# Patient Record
Sex: MALE | Race: WHITE | NOT HISPANIC OR LATINO | URBAN - METROPOLITAN AREA
[De-identification: names, ages, dates, MRNs, and addresses within clinical notes are randomized per-mention and may not be internally consistent; named-entity substitution may affect disease eponyms.]

---

## 2017-05-02 ENCOUNTER — APPOINTMENT (RX ONLY)
Dept: URBAN - METROPOLITAN AREA CLINIC 24 | Facility: CLINIC | Age: 67
Setting detail: DERMATOLOGY
End: 2017-05-02

## 2017-05-02 DIAGNOSIS — L81.4 OTHER MELANIN HYPERPIGMENTATION: ICD-10-CM

## 2017-05-02 DIAGNOSIS — L57.0 ACTINIC KERATOSIS: ICD-10-CM

## 2017-05-02 DIAGNOSIS — D18.0 HEMANGIOMA: ICD-10-CM

## 2017-05-02 DIAGNOSIS — D22 MELANOCYTIC NEVI: ICD-10-CM

## 2017-05-02 DIAGNOSIS — L82.1 OTHER SEBORRHEIC KERATOSIS: ICD-10-CM

## 2017-05-02 DIAGNOSIS — Z85.828 PERSONAL HISTORY OF OTHER MALIGNANT NEOPLASM OF SKIN: ICD-10-CM

## 2017-05-02 DIAGNOSIS — Z87.2 PERSONAL HISTORY OF DISEASES OF THE SKIN AND SUBCUTANEOUS TISSUE: ICD-10-CM

## 2017-05-02 PROBLEM — D22.5 MELANOCYTIC NEVI OF TRUNK: Status: ACTIVE | Noted: 2017-05-02

## 2017-05-02 PROBLEM — L29.8 OTHER PRURITUS: Status: ACTIVE | Noted: 2017-05-02

## 2017-05-02 PROBLEM — D18.01 HEMANGIOMA OF SKIN AND SUBCUTANEOUS TISSUE: Status: ACTIVE | Noted: 2017-05-02

## 2017-05-02 PROCEDURE — 99213 OFFICE O/P EST LOW 20 MIN: CPT | Mod: 25

## 2017-05-02 PROCEDURE — 17003 DESTRUCT PREMALG LES 2-14: CPT

## 2017-05-02 PROCEDURE — 17000 DESTRUCT PREMALG LESION: CPT

## 2017-05-02 PROCEDURE — ? COUNSELING

## 2017-05-02 PROCEDURE — ? LIQUID NITROGEN

## 2017-05-02 ASSESSMENT — LOCATION DETAILED DESCRIPTION DERM
LOCATION DETAILED: RIGHT SUPERIOR LATERAL MIDBACK
LOCATION DETAILED: RIGHT MEDIAL TRAPEZIAL NECK
LOCATION DETAILED: RIGHT POSTERIOR SHOULDER
LOCATION DETAILED: LEFT POSTERIOR SHOULDER
LOCATION DETAILED: LEFT SUPERIOR MEDIAL FOREHEAD
LOCATION DETAILED: RIGHT MEDIAL SUPERIOR CHEST
LOCATION DETAILED: LEFT INFERIOR CENTRAL MALAR CHEEK
LOCATION DETAILED: LEFT SUPERIOR CENTRAL MALAR CHEEK
LOCATION DETAILED: PERIUMBILICAL SKIN
LOCATION DETAILED: RIGHT INFERIOR CENTRAL MALAR CHEEK
LOCATION DETAILED: RIGHT LATERAL FOREHEAD

## 2017-05-02 ASSESSMENT — LOCATION SIMPLE DESCRIPTION DERM
LOCATION SIMPLE: RIGHT FOREHEAD
LOCATION SIMPLE: LEFT FOREHEAD
LOCATION SIMPLE: ABDOMEN
LOCATION SIMPLE: RIGHT CHEEK
LOCATION SIMPLE: LEFT CHEEK
LOCATION SIMPLE: LEFT SHOULDER
LOCATION SIMPLE: CHEST
LOCATION SIMPLE: RIGHT SHOULDER
LOCATION SIMPLE: RIGHT LOWER BACK
LOCATION SIMPLE: POSTERIOR NECK

## 2017-05-02 ASSESSMENT — LOCATION ZONE DERM
LOCATION ZONE: NECK
LOCATION ZONE: ARM
LOCATION ZONE: FACE
LOCATION ZONE: TRUNK

## 2017-05-02 NOTE — PROCEDURE: LIQUID NITROGEN
Number Of Freeze-Thaw Cycles: 1 freeze-thaw cycle
Post-Care Instructions: I reviewed with the patient in detail post-care instructions. Patient is to wear sunprotection, and avoid picking at any of the treated lesions. Pt may apply Vaseline to crusted or scabbing areas.
Duration Of Freeze Thaw-Cycle (Seconds): 4
Detail Level: Simple
Consent: The patient's consent was obtained including but not limited to risks of crusting, scabbing, blistering, scarring, darker or lighter pigmentary change, recurrence, incomplete removal and infection.
Render Post-Care Instructions In Note?: no

## 2017-12-11 ENCOUNTER — RX ONLY (OUTPATIENT)
Age: 67
Setting detail: RX ONLY
End: 2017-12-11

## 2017-12-11 RX ORDER — BETAMETHASONE DIPROPIONATE 0.5 MG/G
LOTION TOPICAL
Qty: 1 | Refills: 4 | Status: ERX

## 2018-02-26 PROBLEM — R53.83 FATIGUE: Status: ACTIVE | Noted: 2018-02-26

## 2018-08-09 ENCOUNTER — HOSPITAL ENCOUNTER (OUTPATIENT)
Dept: MRI IMAGING | Age: 68
Discharge: HOME | End: 2018-08-09
Attending: INTERNAL MEDICINE
Payer: MEDICARE

## 2018-08-09 DIAGNOSIS — R41.3 MEMORY LOSS: ICD-10-CM

## 2018-08-09 PROCEDURE — 70551 MRI BRAIN STEM W/O DYE: CPT

## 2018-09-24 ENCOUNTER — HOSPITAL ENCOUNTER (OUTPATIENT)
Dept: LAB | Age: 68
Discharge: HOME | End: 2018-09-24

## 2018-09-24 PROCEDURE — 88305 TISSUE EXAM BY PATHOLOGIST: CPT

## 2018-09-27 NOTE — PROGRESS NOTES
Please call patient to let him know that results of biopsy came back negative (NO CANCER). He can cancel his TRUS talk appointment with me and instead return in 6 months with PSA. Can you get him scheduled for this?   Thank you

## 2019-12-30 ENCOUNTER — HOSPITAL ENCOUNTER (OUTPATIENT)
Dept: SURGERY | Age: 69
Discharge: HOME | End: 2019-12-30

## 2019-12-30 VITALS — HEIGHT: 69 IN | BODY MASS INDEX: 29.62 KG/M2 | WEIGHT: 200 LBS

## 2020-01-06 ENCOUNTER — ANESTHESIA EVENT (OUTPATIENT)
Dept: ENDOSCOPY | Age: 70
End: 2020-01-06
Payer: MEDICARE

## 2020-01-06 ENCOUNTER — ANESTHESIA (OUTPATIENT)
Dept: ENDOSCOPY | Age: 70
End: 2020-01-06
Payer: MEDICARE

## 2020-01-06 ENCOUNTER — HOSPITAL ENCOUNTER (OUTPATIENT)
Age: 70
Setting detail: OUTPATIENT SURGERY
Discharge: HOME | End: 2020-01-06
Attending: SURGERY | Admitting: SURGERY
Payer: MEDICARE

## 2020-01-06 VITALS
OXYGEN SATURATION: 96 % | RESPIRATION RATE: 15 BRPM | WEIGHT: 206 LBS | DIASTOLIC BLOOD PRESSURE: 62 MMHG | HEART RATE: 69 BPM | BODY MASS INDEX: 30.42 KG/M2 | SYSTOLIC BLOOD PRESSURE: 111 MMHG | TEMPERATURE: 97.8 F

## 2020-01-06 DIAGNOSIS — Z86.010 HISTORY OF COLON POLYPS: ICD-10-CM

## 2020-01-06 PROCEDURE — 74011250636 HC RX REV CODE- 250/636: Performed by: ANESTHESIOLOGY

## 2020-01-06 PROCEDURE — 74011250636 HC RX REV CODE- 250/636: Performed by: NURSE ANESTHETIST, CERTIFIED REGISTERED

## 2020-01-06 PROCEDURE — 74011000250 HC RX REV CODE- 250: Performed by: NURSE ANESTHETIST, CERTIFIED REGISTERED

## 2020-01-06 PROCEDURE — 76040000026: Performed by: SURGERY

## 2020-01-06 PROCEDURE — 76060000032 HC ANESTHESIA 0.5 TO 1 HR: Performed by: SURGERY

## 2020-01-06 RX ORDER — PROPOFOL 10 MG/ML
INJECTION, EMULSION INTRAVENOUS AS NEEDED
Status: DISCONTINUED | OUTPATIENT
Start: 2020-01-06 | End: 2020-01-06 | Stop reason: HOSPADM

## 2020-01-06 RX ORDER — EPHEDRINE SULFATE/0.9% NACL/PF 50 MG/5 ML
SYRINGE (ML) INTRAVENOUS AS NEEDED
Status: DISCONTINUED | OUTPATIENT
Start: 2020-01-06 | End: 2020-01-06 | Stop reason: HOSPADM

## 2020-01-06 RX ORDER — OXYCODONE AND ACETAMINOPHEN 10; 325 MG/1; MG/1
1 TABLET ORAL AS NEEDED
Status: CANCELLED | OUTPATIENT
Start: 2020-01-06

## 2020-01-06 RX ORDER — OXYCODONE HYDROCHLORIDE 5 MG/1
5 TABLET ORAL
Status: CANCELLED | OUTPATIENT
Start: 2020-01-06 | End: 2020-01-07

## 2020-01-06 RX ORDER — SODIUM CHLORIDE 0.9 % (FLUSH) 0.9 %
5-40 SYRINGE (ML) INJECTION EVERY 8 HOURS
Status: CANCELLED | OUTPATIENT
Start: 2020-01-06

## 2020-01-06 RX ORDER — SODIUM CHLORIDE, SODIUM LACTATE, POTASSIUM CHLORIDE, CALCIUM CHLORIDE 600; 310; 30; 20 MG/100ML; MG/100ML; MG/100ML; MG/100ML
75 INJECTION, SOLUTION INTRAVENOUS CONTINUOUS
Status: DISCONTINUED | OUTPATIENT
Start: 2020-01-06 | End: 2020-01-06 | Stop reason: HOSPADM

## 2020-01-06 RX ORDER — PROPOFOL 10 MG/ML
INJECTION, EMULSION INTRAVENOUS
Status: DISCONTINUED | OUTPATIENT
Start: 2020-01-06 | End: 2020-01-06 | Stop reason: HOSPADM

## 2020-01-06 RX ORDER — SODIUM CHLORIDE 0.9 % (FLUSH) 0.9 %
5-40 SYRINGE (ML) INJECTION AS NEEDED
Status: CANCELLED | OUTPATIENT
Start: 2020-01-06

## 2020-01-06 RX ORDER — HYDROMORPHONE HYDROCHLORIDE 2 MG/ML
0.5 INJECTION, SOLUTION INTRAMUSCULAR; INTRAVENOUS; SUBCUTANEOUS
Status: CANCELLED | OUTPATIENT
Start: 2020-01-06

## 2020-01-06 RX ADMIN — SODIUM CHLORIDE, SODIUM LACTATE, POTASSIUM CHLORIDE, AND CALCIUM CHLORIDE 75 ML/HR: 600; 310; 30; 20 INJECTION, SOLUTION INTRAVENOUS at 07:00

## 2020-01-06 RX ADMIN — Medication 10 MG: at 08:15

## 2020-01-06 RX ADMIN — Medication 10 MG: at 07:50

## 2020-01-06 RX ADMIN — PROPOFOL 180 MCG/KG/MIN: 10 INJECTION, EMULSION INTRAVENOUS at 07:40

## 2020-01-06 RX ADMIN — PROPOFOL 100 MG: 10 INJECTION, EMULSION INTRAVENOUS at 07:40

## 2020-01-06 NOTE — OP NOTES
Colonoscopy Procedure Note    Date of procedure: 2020      Name: Savana Rodriguez      MRN: [id number]       : 1950       Age: 78 y.o. Sex: male    Preoperative Diagnosis: 1. History of polyps          Postoperative Diagnosis: 1. same      2. Diverticulosis       3. Internal hemorrhoids    Procedure in Detail:  Informed consent was obtained for the procedure. The patient was placed in the left lateral decubitus position and sedation was induced by anesthesia. The [id number] was inserted into the rectum and advanced under direct vision to the cecum, which was identified by the ileocecal valve and appendiceal orifice. The quality of the colonic preparation was fair. A careful inspection was made as the colonoscope was withdrawn, including a retroflexed view of the rectum; findings and interventions are described below. Appropriate photodocumentation was obtained. Findings:   Rectum:     - Protruding lesions:     -Internal Hemorrhoids  Sigmoid:     - Excavated lesions:     - Diverticulosis  Descending Colon:     - Excavated lesions:     - Diverticulosis  Transverse Colon:   Normal  Ascending Colon:   Normal  Cecum:   Normal    Specimens: No specimens were collected. Complications: None; patient tolerated the procedure well. EBL - minimal    Recommendations:   - Repeat colonoscopy in 5 years.      Signed By: Omar Dupree MD  [facility] - Bariatric & Minimally Invasive Surgery  2020 8:19 AM

## 2020-01-06 NOTE — H&P
General Boast, MD   Bariatric & Advanced Laparoscopic Surgery & Endoscopy  [address] [place] [address]  Phone [phone]   Fax [phone]      Date of visit: 2020      Primary/Requesting provider: Bárbara Parikh MD         Name: Quentin Aponte      MRN: [id number]       : 1950       Age: 78 y.o. Sex: male        PCP: Bárbara Parikh MD     CC:    No chief complaint on file. HPI:     Quentin Aponte is a 78 y.o. male was referred here for a colonoscopy by PCP. He had a colonoscopy about 6 years ago and was found to have 1 polyp. He has had about 3 colonoscopies total in his life. He does not remember the type of polyp. He takes aspirin daily.       Family hx of colon cancer NO      Previous colonoscopy YES   BRBPR NO   Melena NO   Loss of appetite NO   Weight loss NO      Change in bowel habits NO       Name: Tiffani Gary  Accession Number:   [id number]  MR #   [id number]  Date Obtained:   2009      **DIAGNOSIS*           \"COLON POLYP AT 48 CM\":  FRAGMENT OF TUBULAR ADENOMA.    lbk/2009    Electronically Signed Out         Sign Out Date: 2009 Elvira Sommer M.D.    PMH:    Past Medical History:   Diagnosis Date    Arrhythmia     Paroxysmal at fibrillation,at fib w/ RVR    Atrial fibrillation and flutter ([address] 2015    states had cardiac ablation    Bursitis of shoulder 2014    Dyslipidemia 2013    Family history of ischemic heart disease 2012    Hx of echocardiogram 2015    LVEF 60-65%    Hypercholesterolemia     managed with medication 2019    Hyperlipidemia 2013    Hypertension     19  States not taking medication    Ill-defined condition     HLD    Personal history of colonic polyps 2013    Unstable angina ([address] 2012       PSH:    Past Surgical History:   Procedure Laterality Date    HX COLONOSCOPY       1 polyp    HX HEART CATHETERIZATION  2012    HX HEENT      tonsillectomy    HX UROLOGICAL      vasectomy       MEDS:    Current Facility-Administered Medications   Medication    lactated Ringers infusion       ALLERGIES:      Allergies   Allergen Reactions    Lortab [Hydrocodone-Acetaminophen] Nausea and Vomiting       SH:    Social History     Tobacco Use    Smoking status: Never Smoker    Smokeless tobacco: Never Used   Substance Use Topics    Alcohol use: Yes     Alcohol/week: 1.7 standard drinks     Types: 2 Cans of beer per week    Drug use: No       FH:    Family History   Problem Relation Age of Onset    Heart Disease Father        Review of systems:  Review of Systems   Constitutional: Negative for chills, fever and weight loss. HENT: Positive for hearing loss. Negative for ear pain and tinnitus. Eyes:        Glasses   Respiratory: Negative for cough, shortness of breath and wheezing. Cardiovascular:        Dr. Naveed Leiva   Gastrointestinal: Negative for heartburn, nausea and vomiting. Genitourinary: Negative for dysuria, frequency and urgency. Musculoskeletal: Negative for back pain, joint pain and neck pain. Skin: Negative for itching and rash. Neurological: Negative for dizziness, seizures and loss of consciousness. Endo/Heme/Allergies: Negative for environmental allergies and polydipsia. Bruises/bleeds easily. Psychiatric/Behavioral: Negative for depression. The patient is not nervous/anxious and does not have insomnia. Physical Exam:     Visit Vitals  /73   Pulse 73   Temp 97.8 °F (36.6 °C)   Resp 12   Wt 206 lb (93.4 kg)   SpO2 96%   BMI 30.42 kg/m²       General:  Well-developed, well-nourished, no distress. Psych:  Cooperative, good insight and judgement. Neuro:  Alert, oriented to person, place and time. HEENT:  Normocephalic, atraumatic. Sclera clear. Lungs:  Unlabored breathing. Symmetrical chest expansion. Chest wall:  No tenderness or deformity.   Heart:  Regular rate and rhythm. No JVD. Abdomen:  Soft, non-tender, non-distended. No palpable masses. Extremities:  Extremities normal, atraumatic, no cyanosis or edema. Skin:  Skin color, texture, turgor normal. No rashes. Labs: All recent labs were reviewed. Normal Hgb. _    Assessment:  Raudel Oates is a 78 y.o. male was referred here for a colonoscopy. Recommendations:     1. Schedule for colonoscopy. The risks, benefits, alternatives, and consequences were reviewed with the patient, who expressed verbal understanding and agreement to proceed.          Signed: Avery Doe MD  Bariatric & Minimally Invasive Surgery  [facility]  1/6/2020

## 2020-01-06 NOTE — ANESTHESIA PREPROCEDURE EVALUATION
Relevant Problems   No relevant active problems       Anesthetic History   No history of anesthetic complications            Review of Systems / Medical History  Patient summary reviewed and pertinent labs reviewed    Pulmonary  Within defined limits                 Neuro/Psych   Within defined limits           Cardiovascular    Hypertension        Dysrhythmias : atrial fibrillation  CAD      Comments: S/p ablation 2015 2015 echo 60 to 65% EF   GI/Hepatic/Renal  Within defined limits              Endo/Other  Within defined limits           Other Findings            Physical Exam    Airway  Mallampati: II  TM Distance: > 6 cm  Neck ROM: normal range of motion   Mouth opening: Normal     Cardiovascular    Rhythm: regular           Dental    Dentition: Caps/crowns     Pulmonary                 Abdominal  GI exam deferred       Other Findings            Anesthetic Plan    ASA: 2  Anesthesia type: total IV anesthesia          Induction: Intravenous  Anesthetic plan and risks discussed with: Patient

## 2020-01-06 NOTE — ANESTHESIA POSTPROCEDURE EVALUATION
Procedure(s):  COLONOSCOPY/ BMI 31.    total IV anesthesia    Anesthesia Post Evaluation      Multimodal analgesia: multimodal analgesia used between 6 hours prior to anesthesia start to PACU discharge  Patient location during evaluation: PACU  Patient participation: complete - patient participated  Level of consciousness: awake  Pain management: adequate  Airway patency: patent  Anesthetic complications: no  Cardiovascular status: acceptable  Respiratory status: acceptable  Hydration status: acceptable  Post anesthesia nausea and vomiting:  none      Vitals Value Taken Time   /62 1/6/2020  8:45 AM   Temp     Pulse 69 1/6/2020  8:45 AM   Resp 15 1/6/2020  8:45 AM   SpO2 96 % 1/6/2020  8:45 AM

## 2020-01-06 NOTE — DISCHARGE INSTRUCTIONS
Gastrointestinal Colonoscopy/Flexible Sigmoidoscopy - Lower Exam Discharge Instructions  1. Call Dr. Margoth Jean at office for any problems or questions. 2. Contact the doctors office for follow up appointment as directed  3. Medication may cause drowsiness for several hours, therefore:  · Do not drive or operate machinery for reminder of the day. · No alcohol today. · Do not make any important or legal decisions for 24 hours. · Do not sign any legal documents for 24 hours. 4. Ordinarily, you may resume regular diet and activity after exam unless otherwise specified by your physician. 5. Because of air put into your colon during exam, you may experience some abdominal distension, relieved by the passage of gas, for several hours. 6. Contact your physician if you have any of the following:  · Excessive amount of bleeding - large amount when having a bowel movement. Occasional specks of blood with bowel movement would not be unusual.  · Severe abdominal pain  · Fever or Chills    Instructions given to [employer] and other family members.   Instructions given by:  Reid Rodrigues RN

## 2020-02-10 ENCOUNTER — APPOINTMENT (RX ONLY)
Dept: URBAN - METROPOLITAN AREA CLINIC 23 | Facility: CLINIC | Age: 70
Setting detail: DERMATOLOGY
End: 2020-02-10

## 2020-02-10 DIAGNOSIS — L57.0 ACTINIC KERATOSIS: ICD-10-CM

## 2020-02-10 DIAGNOSIS — Z87.2 PERSONAL HISTORY OF DISEASES OF THE SKIN AND SUBCUTANEOUS TISSUE: ICD-10-CM

## 2020-02-10 DIAGNOSIS — L81.4 OTHER MELANIN HYPERPIGMENTATION: ICD-10-CM

## 2020-02-10 DIAGNOSIS — D22 MELANOCYTIC NEVI: ICD-10-CM

## 2020-02-10 DIAGNOSIS — D485 NEOPLASM OF UNCERTAIN BEHAVIOR OF SKIN: ICD-10-CM

## 2020-02-10 DIAGNOSIS — L82.1 OTHER SEBORRHEIC KERATOSIS: ICD-10-CM

## 2020-02-10 DIAGNOSIS — D18.0 HEMANGIOMA: ICD-10-CM

## 2020-02-10 DIAGNOSIS — Z85.828 PERSONAL HISTORY OF OTHER MALIGNANT NEOPLASM OF SKIN: ICD-10-CM

## 2020-02-10 DIAGNOSIS — Z71.89 OTHER SPECIFIED COUNSELING: ICD-10-CM

## 2020-02-10 PROBLEM — D22.5 MELANOCYTIC NEVI OF TRUNK: Status: ACTIVE | Noted: 2020-02-10

## 2020-02-10 PROBLEM — L29.8 OTHER PRURITUS: Status: ACTIVE | Noted: 2020-02-10

## 2020-02-10 PROBLEM — H91.90 UNSPECIFIED HEARING LOSS, UNSPECIFIED EAR: Status: ACTIVE | Noted: 2020-02-10

## 2020-02-10 PROBLEM — D48.5 NEOPLASM OF UNCERTAIN BEHAVIOR OF SKIN: Status: ACTIVE | Noted: 2020-02-10

## 2020-02-10 PROBLEM — J30.1 ALLERGIC RHINITIS DUE TO POLLEN: Status: ACTIVE | Noted: 2020-02-10

## 2020-02-10 PROBLEM — D18.01 HEMANGIOMA OF SKIN AND SUBCUTANEOUS TISSUE: Status: ACTIVE | Noted: 2020-02-10

## 2020-02-10 PROCEDURE — 11102 TANGNTL BX SKIN SINGLE LES: CPT

## 2020-02-10 PROCEDURE — ? LIQUID NITROGEN

## 2020-02-10 PROCEDURE — 17003 DESTRUCT PREMALG LES 2-14: CPT

## 2020-02-10 PROCEDURE — ? COUNSELING

## 2020-02-10 PROCEDURE — ? BIOPSY BY SHAVE METHOD

## 2020-02-10 PROCEDURE — 99214 OFFICE O/P EST MOD 30 MIN: CPT | Mod: 25

## 2020-02-10 PROCEDURE — 17000 DESTRUCT PREMALG LESION: CPT | Mod: 59

## 2020-02-10 ASSESSMENT — LOCATION ZONE DERM
LOCATION ZONE: TRUNK
LOCATION ZONE: NECK
LOCATION ZONE: FACE
LOCATION ZONE: LEG
LOCATION ZONE: EAR
LOCATION ZONE: ARM

## 2020-02-10 ASSESSMENT — LOCATION SIMPLE DESCRIPTION DERM
LOCATION SIMPLE: RIGHT SHOULDER
LOCATION SIMPLE: LEFT POSTERIOR THIGH
LOCATION SIMPLE: LEFT CHEEK
LOCATION SIMPLE: RIGHT LOWER BACK
LOCATION SIMPLE: ABDOMEN
LOCATION SIMPLE: RIGHT FOREARM
LOCATION SIMPLE: CHEST
LOCATION SIMPLE: RIGHT UPPER ARM
LOCATION SIMPLE: LEFT UPPER BACK
LOCATION SIMPLE: LEFT FOREHEAD
LOCATION SIMPLE: POSTERIOR NECK
LOCATION SIMPLE: LEFT TEMPLE
LOCATION SIMPLE: LEFT SHOULDER
LOCATION SIMPLE: LEFT EAR
LOCATION SIMPLE: LEFT LOWER BACK
LOCATION SIMPLE: RIGHT ZYGOMA
LOCATION SIMPLE: RIGHT ANTERIOR NECK

## 2020-02-10 ASSESSMENT — LOCATION DETAILED DESCRIPTION DERM
LOCATION DETAILED: RIGHT MEDIAL SUPERIOR CHEST
LOCATION DETAILED: LEFT SUPERIOR FOREHEAD
LOCATION DETAILED: LEFT LATERAL TEMPLE
LOCATION DETAILED: LEFT FOREHEAD
LOCATION DETAILED: LEFT DISTAL POSTERIOR THIGH
LOCATION DETAILED: LEFT SUPERIOR UPPER BACK
LOCATION DETAILED: LEFT POSTERIOR SHOULDER
LOCATION DETAILED: LEFT ANTERIOR SHOULDER
LOCATION DETAILED: LEFT SUPERIOR CENTRAL MALAR CHEEK
LOCATION DETAILED: RIGHT CLAVICULAR NECK
LOCATION DETAILED: RIGHT SUPERIOR LATERAL MIDBACK
LOCATION DETAILED: RIGHT POSTERIOR SHOULDER
LOCATION DETAILED: LEFT SUPERIOR HELIX
LOCATION DETAILED: PERIUMBILICAL SKIN
LOCATION DETAILED: RIGHT PROXIMAL POSTERIOR UPPER ARM
LOCATION DETAILED: RIGHT DISTAL ULNAR DORSAL FOREARM
LOCATION DETAILED: RIGHT MEDIAL TRAPEZIAL NECK
LOCATION DETAILED: LEFT SUPERIOR MEDIAL MIDBACK
LOCATION DETAILED: RIGHT CENTRAL ZYGOMA

## 2020-02-10 NOTE — PROCEDURE: BIOPSY BY SHAVE METHOD
Bill For Surgical Tray: no
X Size Of Lesion In Cm: 0
Hemostasis: Aluminum Chloride
Silver Nitrate Text: The wound bed was treated with silver nitrate after the biopsy was performed.
Electrodesiccation And Curettage Text: The wound bed was treated with electrodesiccation and curettage after the biopsy was performed.
Biopsy Method: Dermablade
Detail Level: Detailed
Type Of Destruction Used: Curettage
Wound Care: Petrolatum
Electrodesiccation Text: The wound bed was treated with electrodesiccation after the biopsy was performed.
Anesthesia Type: 1% lidocaine with 1:100,000 epinephrine and a 1:6 solution of 8.4% sodium bicarbonate
Cryotherapy Text: The wound bed was treated with cryotherapy after the biopsy was performed.
Depth Of Biopsy: dermis
Anesthesia Volume In Cc: 0.5
Curettage Text: The wound bed was treated with curettage after the biopsy was performed.
Dressing: bandage
Was A Bandage Applied: Yes
Accession #: [id number]
Billing Type: Third-Party Bill
Biopsy Type: H and E

## 2020-02-10 NOTE — PROCEDURE: LIQUID NITROGEN
Render Note In Bullet Format When Appropriate: No
Post-Care Instructions: I reviewed with the patient in detail post-care instructions. Patient is to wear sunprotection, and avoid picking at any of the treated lesions. Pt may apply Vaseline to crusted or scabbing areas.
Consent: The patient's consent was obtained including but not limited to risks of crusting, scabbing, blistering, scarring, darker or lighter pigmentary change, recurrence, incomplete removal and infection.
Number Of Freeze-Thaw Cycles: 1 freeze-thaw cycle
Duration Of Freeze Thaw-Cycle (Seconds): 4
Detail Level: Simple

## 2020-08-31 ENCOUNTER — HOSPITAL ENCOUNTER (OUTPATIENT)
Dept: MRI IMAGING | Age: 70
Discharge: HOME | End: 2020-08-31
Attending: INTERNAL MEDICINE
Payer: MEDICARE

## 2020-08-31 DIAGNOSIS — R42 DIZZINESS: ICD-10-CM

## 2020-08-31 PROCEDURE — 74011250636 HC RX REV CODE- 250/636: Performed by: INTERNAL MEDICINE

## 2020-08-31 PROCEDURE — 70553 MRI BRAIN STEM W/O & W/DYE: CPT

## 2020-08-31 PROCEDURE — A9575 INJ GADOTERATE MEGLUMI 0.1ML: HCPCS | Performed by: INTERNAL MEDICINE

## 2020-08-31 RX ORDER — GADOTERATE MEGLUMINE 376.9 MG/ML
20 INJECTION INTRAVENOUS
Status: COMPLETED | OUTPATIENT
Start: 2020-08-31 | End: 2020-08-31

## 2020-08-31 RX ORDER — SODIUM CHLORIDE 0.9 % (FLUSH) 0.9 %
10 SYRINGE (ML) INJECTION
Status: COMPLETED | OUTPATIENT
Start: 2020-08-31 | End: 2020-08-31

## 2020-08-31 RX ADMIN — Medication 10 ML: at 18:18

## 2020-08-31 RX ADMIN — GADOTERATE MEGLUMINE 20 ML: 376.9 INJECTION INTRAVENOUS at 18:18

## 2021-04-28 ENCOUNTER — APPOINTMENT (RX ONLY)
Dept: URBAN - METROPOLITAN AREA CLINIC 24 | Facility: CLINIC | Age: 71
Setting detail: DERMATOLOGY
End: 2021-04-28

## 2021-04-28 DIAGNOSIS — L82.0 INFLAMED SEBORRHEIC KERATOSIS: ICD-10-CM

## 2021-04-28 DIAGNOSIS — L57.8 OTHER SKIN CHANGES DUE TO CHRONIC EXPOSURE TO NONIONIZING RADIATION: ICD-10-CM

## 2021-04-28 DIAGNOSIS — L259 CONTACT DERMATITIS AND OTHER ECZEMA, UNSPECIFIED CAUSE: ICD-10-CM

## 2021-04-28 DIAGNOSIS — Z85.828 PERSONAL HISTORY OF OTHER MALIGNANT NEOPLASM OF SKIN: ICD-10-CM

## 2021-04-28 DIAGNOSIS — L82.1 OTHER SEBORRHEIC KERATOSIS: ICD-10-CM

## 2021-04-28 DIAGNOSIS — Z87.2 PERSONAL HISTORY OF DISEASES OF THE SKIN AND SUBCUTANEOUS TISSUE: ICD-10-CM

## 2021-04-28 DIAGNOSIS — Z71.89 OTHER SPECIFIED COUNSELING: ICD-10-CM

## 2021-04-28 DIAGNOSIS — D22 MELANOCYTIC NEVI: ICD-10-CM

## 2021-04-28 PROBLEM — H91.90 UNSPECIFIED HEARING LOSS, UNSPECIFIED EAR: Status: ACTIVE | Noted: 2021-04-28

## 2021-04-28 PROBLEM — J30.1 ALLERGIC RHINITIS DUE TO POLLEN: Status: ACTIVE | Noted: 2021-04-28

## 2021-04-28 PROBLEM — L23.9 ALLERGIC CONTACT DERMATITIS, UNSPECIFIED CAUSE: Status: ACTIVE | Noted: 2021-04-28

## 2021-04-28 PROBLEM — D22.5 MELANOCYTIC NEVI OF TRUNK: Status: ACTIVE | Noted: 2021-04-28

## 2021-04-28 PROCEDURE — ? OTHER

## 2021-04-28 PROCEDURE — ? COUNSELING

## 2021-04-28 PROCEDURE — 99213 OFFICE O/P EST LOW 20 MIN: CPT

## 2021-04-28 ASSESSMENT — LOCATION SIMPLE DESCRIPTION DERM
LOCATION SIMPLE: LEFT SHOULDER
LOCATION SIMPLE: LEFT POSTERIOR THIGH
LOCATION SIMPLE: RIGHT UPPER BACK
LOCATION SIMPLE: CHEST
LOCATION SIMPLE: LOWER BACK
LOCATION SIMPLE: RIGHT FOREHEAD
LOCATION SIMPLE: LEFT UPPER BACK
LOCATION SIMPLE: POSTERIOR NECK
LOCATION SIMPLE: ABDOMEN

## 2021-04-28 ASSESSMENT — LOCATION DETAILED DESCRIPTION DERM
LOCATION DETAILED: LEFT INFERIOR POSTERIOR NECK
LOCATION DETAILED: EPIGASTRIC SKIN
LOCATION DETAILED: LEFT DISTAL POSTERIOR THIGH
LOCATION DETAILED: LEFT SUPERIOR UPPER BACK
LOCATION DETAILED: RIGHT MID-UPPER BACK
LOCATION DETAILED: RIGHT MEDIAL TRAPEZIAL NECK
LOCATION DETAILED: RIGHT SUPERIOR FOREHEAD
LOCATION DETAILED: RIGHT MEDIAL SUPERIOR CHEST
LOCATION DETAILED: INFERIOR LUMBAR SPINE
LOCATION DETAILED: PERIUMBILICAL SKIN
LOCATION DETAILED: LEFT ANTERIOR SHOULDER

## 2021-04-28 ASSESSMENT — LOCATION ZONE DERM
LOCATION ZONE: TRUNK
LOCATION ZONE: LEG
LOCATION ZONE: FACE
LOCATION ZONE: NECK
LOCATION ZONE: ARM

## 2021-04-28 NOTE — PROCEDURE: COUNSELING
Detail Level: Generalized
Detail Level: Simple
Detail Level: Detailed
Sunscreen Recommendations: 50 SPF+, sun protective clothing

## 2021-04-28 NOTE — PROCEDURE: OTHER
Other (Free Text): Pt using pain patch for back
Detail Level: Simple
Render Risk Assessment In Note?: no
Note Text (......Xxx Chief Complaint.): This diagnosis correlates with the

## 2022-03-19 PROBLEM — R53.83 FATIGUE: Status: ACTIVE | Noted: 2018-02-26

## 2022-05-04 ENCOUNTER — APPOINTMENT (RX ONLY)
Dept: URBAN - METROPOLITAN AREA CLINIC 24 | Facility: CLINIC | Age: 72
Setting detail: DERMATOLOGY
End: 2022-05-04

## 2022-05-04 DIAGNOSIS — L40.0 PSORIASIS VULGARIS: ICD-10-CM | Status: WELL CONTROLLED

## 2022-05-04 DIAGNOSIS — L57.0 ACTINIC KERATOSIS: ICD-10-CM

## 2022-05-04 DIAGNOSIS — Z71.89 OTHER SPECIFIED COUNSELING: ICD-10-CM

## 2022-05-04 DIAGNOSIS — Z87.2 PERSONAL HISTORY OF DISEASES OF THE SKIN AND SUBCUTANEOUS TISSUE: ICD-10-CM

## 2022-05-04 DIAGNOSIS — Z85.828 PERSONAL HISTORY OF OTHER MALIGNANT NEOPLASM OF SKIN: ICD-10-CM

## 2022-05-04 DIAGNOSIS — D22 MELANOCYTIC NEVI: ICD-10-CM

## 2022-05-04 DIAGNOSIS — L82.1 OTHER SEBORRHEIC KERATOSIS: ICD-10-CM

## 2022-05-04 DIAGNOSIS — L57.8 OTHER SKIN CHANGES DUE TO CHRONIC EXPOSURE TO NONIONIZING RADIATION: ICD-10-CM

## 2022-05-04 PROBLEM — D22.5 MELANOCYTIC NEVI OF TRUNK: Status: ACTIVE | Noted: 2022-05-04

## 2022-05-04 PROBLEM — L29.8 OTHER PRURITUS: Status: ACTIVE | Noted: 2022-05-04

## 2022-05-04 PROBLEM — J30.1 ALLERGIC RHINITIS DUE TO POLLEN: Status: ACTIVE | Noted: 2022-05-04

## 2022-05-04 PROCEDURE — ? LIQUID NITROGEN

## 2022-05-04 PROCEDURE — ? COUNSELING

## 2022-05-04 PROCEDURE — 17003 DESTRUCT PREMALG LES 2-14: CPT

## 2022-05-04 PROCEDURE — 17000 DESTRUCT PREMALG LESION: CPT

## 2022-05-04 PROCEDURE — 99213 OFFICE O/P EST LOW 20 MIN: CPT | Mod: 25

## 2022-05-04 ASSESSMENT — LOCATION ZONE DERM
LOCATION ZONE: ARM
LOCATION ZONE: TRUNK
LOCATION ZONE: LEG
LOCATION ZONE: SCALP
LOCATION ZONE: NECK
LOCATION ZONE: FACE

## 2022-05-04 ASSESSMENT — LOCATION DETAILED DESCRIPTION DERM
LOCATION DETAILED: STERNUM
LOCATION DETAILED: LEFT LATERAL ABDOMEN
LOCATION DETAILED: RIGHT CLAVICULAR NECK
LOCATION DETAILED: LEFT MID-UPPER BACK
LOCATION DETAILED: LEFT SUPERIOR PARIETAL SCALP
LOCATION DETAILED: RIGHT MEDIAL SUPERIOR CHEST
LOCATION DETAILED: RIGHT MEDIAL TRAPEZIAL NECK
LOCATION DETAILED: RIGHT ANTERIOR DISTAL THIGH
LOCATION DETAILED: XIPHOID
LOCATION DETAILED: LEFT ANTERIOR SHOULDER
LOCATION DETAILED: LEFT INFERIOR POSTERIOR NECK
LOCATION DETAILED: LEFT INFERIOR FOREHEAD
LOCATION DETAILED: RIGHT LATERAL UPPER BACK
LOCATION DETAILED: LEFT PROXIMAL DORSAL FOREARM
LOCATION DETAILED: LEFT SUPERIOR LATERAL UPPER BACK

## 2022-05-04 ASSESSMENT — LOCATION SIMPLE DESCRIPTION DERM
LOCATION SIMPLE: SCALP
LOCATION SIMPLE: ABDOMEN
LOCATION SIMPLE: RIGHT ANTERIOR NECK
LOCATION SIMPLE: LEFT FOREHEAD
LOCATION SIMPLE: LEFT SHOULDER
LOCATION SIMPLE: POSTERIOR NECK
LOCATION SIMPLE: CHEST
LOCATION SIMPLE: LEFT UPPER BACK
LOCATION SIMPLE: RIGHT UPPER BACK
LOCATION SIMPLE: LEFT FOREARM
LOCATION SIMPLE: RIGHT THIGH

## 2022-05-04 NOTE — PROCEDURE: COUNSELING
Detail Level: Generalized
Detail Level: Detailed
Sunscreen Recommendations: 50 SPF+, sun protective clothing
Detail Level: Simple

## 2022-05-04 NOTE — PROCEDURE: LIQUID NITROGEN
Post-Care Instructions: I reviewed with the patient in detail post-care instructions. Patient is to wear sunprotection, and avoid picking at any of the treated lesions. Pt may apply Vaseline to crusted or scabbing areas.
Render Note In Bullet Format When Appropriate: No
Detail Level: Simple
Consent: The patient's consent was obtained including but not limited to risks of crusting, scabbing, blistering, scarring, darker or lighter pigmentary change, recurrence, incomplete removal and infection.
Number Of Freeze-Thaw Cycles: 1 freeze-thaw cycle
Show Applicator Variable?: Yes
Duration Of Freeze Thaw-Cycle (Seconds): 4

## 2022-11-07 ENCOUNTER — APPOINTMENT (RX ONLY)
Dept: URBAN - METROPOLITAN AREA CLINIC 24 | Facility: CLINIC | Age: 72
Setting detail: DERMATOLOGY
End: 2022-11-07

## 2022-11-07 DIAGNOSIS — L57.0 ACTINIC KERATOSIS: ICD-10-CM

## 2022-11-07 DIAGNOSIS — D22 MELANOCYTIC NEVI: ICD-10-CM

## 2022-11-07 DIAGNOSIS — L82.1 OTHER SEBORRHEIC KERATOSIS: ICD-10-CM

## 2022-11-07 DIAGNOSIS — L57.8 OTHER SKIN CHANGES DUE TO CHRONIC EXPOSURE TO NONIONIZING RADIATION: ICD-10-CM

## 2022-11-07 DIAGNOSIS — L40.0 PSORIASIS VULGARIS: ICD-10-CM

## 2022-11-07 DIAGNOSIS — Z71.89 OTHER SPECIFIED COUNSELING: ICD-10-CM

## 2022-11-07 PROBLEM — D22.5 MELANOCYTIC NEVI OF TRUNK: Status: ACTIVE | Noted: 2022-11-07

## 2022-11-07 PROCEDURE — ? COUNSELING

## 2022-11-07 PROCEDURE — 99214 OFFICE O/P EST MOD 30 MIN: CPT | Mod: 25

## 2022-11-07 PROCEDURE — 17003 DESTRUCT PREMALG LES 2-14: CPT

## 2022-11-07 PROCEDURE — ? OTHER

## 2022-11-07 PROCEDURE — ? PRESCRIPTION

## 2022-11-07 PROCEDURE — ? LIQUID NITROGEN

## 2022-11-07 PROCEDURE — 17000 DESTRUCT PREMALG LESION: CPT

## 2022-11-07 RX ORDER — BETAMETHASONE DIPROPIONATE 0.5 MG/G
OINTMENT TOPICAL
Qty: 45 | Refills: 3 | Status: ERX | COMMUNITY
Start: 2022-11-07

## 2022-11-07 RX ADMIN — BETAMETHASONE DIPROPIONATE: 0.5 OINTMENT TOPICAL at 00:00

## 2022-11-07 ASSESSMENT — LOCATION DETAILED DESCRIPTION DERM
LOCATION DETAILED: RIGHT LATERAL SUPERIOR CHEST
LOCATION DETAILED: LEFT INFERIOR CENTRAL MALAR CHEEK
LOCATION DETAILED: LEFT MID-UPPER BACK
LOCATION DETAILED: LEFT INFERIOR POSTERIOR NECK
LOCATION DETAILED: LEFT INFERIOR MEDIAL LOWER BACK
LOCATION DETAILED: EPIGASTRIC SKIN
LOCATION DETAILED: RIGHT CENTRAL ZYGOMA
LOCATION DETAILED: LEFT SUPERIOR LATERAL LOWER BACK
LOCATION DETAILED: LEFT SUPERIOR LATERAL UPPER BACK
LOCATION DETAILED: RIGHT POSTERIOR SHOULDER
LOCATION DETAILED: RIGHT INFERIOR UPPER BACK
LOCATION DETAILED: RIGHT SUPERIOR MEDIAL FOREHEAD

## 2022-11-07 ASSESSMENT — LOCATION SIMPLE DESCRIPTION DERM
LOCATION SIMPLE: ABDOMEN
LOCATION SIMPLE: RIGHT UPPER BACK
LOCATION SIMPLE: LEFT LOWER BACK
LOCATION SIMPLE: RIGHT ZYGOMA
LOCATION SIMPLE: RIGHT FOREHEAD
LOCATION SIMPLE: RIGHT SHOULDER
LOCATION SIMPLE: LEFT CHEEK
LOCATION SIMPLE: LEFT UPPER BACK
LOCATION SIMPLE: CHEST
LOCATION SIMPLE: POSTERIOR NECK

## 2022-11-07 ASSESSMENT — LOCATION ZONE DERM
LOCATION ZONE: FACE
LOCATION ZONE: ARM
LOCATION ZONE: NECK
LOCATION ZONE: TRUNK

## 2022-11-07 NOTE — HPI: FULL BODY SKIN EXAMINATION
What Type Of Note Output Would You Prefer (Optional)?: Standard Output
What Is The Reason For Today's Visit?: Full Body Skin Examination
What Is The Reason For Today's Visit? (Being Monitored For X): concerning skin lesions on an annual basis
How Severe Are Your Spot(S)?: mild
Additional History: History has not transferred over at this time.

## 2022-11-07 NOTE — HPI: RASH
What Type Of Note Output Would You Prefer (Optional)?: Standard Output
How Severe Is Your Rash?: mild
Is This A New Presentation, Or A Follow-Up?: Rash
Additional History: Rash came up after receiving the shingles vaccine

## 2022-11-07 NOTE — PROCEDURE: OTHER
Note Text (......Xxx Chief Complaint.): This diagnosis correlates with the
Detail Level: Zone
Render Risk Assessment In Note?: no
Other (Free Text): Sites of pathergy from back injections are classic psoriasis\\n\\nHe denies joint pain

## 2022-12-11 ASSESSMENT — ENCOUNTER SYMPTOMS
SHORTNESS OF BREATH: 0
EYE DISCHARGE: 0
ABDOMINAL PAIN: 0
HEARTBURN: 0
CONSTIPATION: 0
BLOOD IN STOOL: 0
COUGH: 0
NAUSEA: 0
BACK PAIN: 0
VOMITING: 0
WHEEZING: 0
SKIN LESIONS: 0
DIARRHEA: 0
INDIGESTION: 0
EYE PAIN: 0

## 2022-12-11 NOTE — PROGRESS NOTES
[facility]  [address]   [address], [address] [facility]  [phone]    Rissa Valles  : 1950    CC: Elevated PSA     HPI     Rissa Valles is a 67 y.o.  male with a PMH of elevated PSA s/p negative biopsy 2018 who has been lost to follow up since that time. Referred 10/2021 for elevated PSA has being followed since that time. No FH of prostate cancer. Nocturia 1-2 times per night, but no hematuria, urgency, frequency, retention, UTIs or other urinary symptoms today. Not on any medications for prostate. Most recent PSA down to 5.1 (2022) consistent with baseline.       PSA Trend:      Lab Results   Component Value Date    PSA 6.0 (H) 2022    PSA 5.1 (H) 2019       Past Medical History:   Diagnosis Date    Arrhythmia     Paroxysmal at fibrillation,at fib w/ RVR    Atrial fibrillation and flutter ([facility] 2015    states had cardiac ablation    Bursitis of shoulder 2014    Dyslipidemia 2013    Family history of ischemic heart disease 2012    Hx of echocardiogram 2015    LVEF 60-65%    Hypercholesterolemia     managed with medication 2019    Hyperlipidemia 2013    Hypertension     19  States not taking medication    Ill-defined condition     HLD    Personal history of colonic polyps 2013    Unstable angina ([facility] 2012     Past Surgical History:   Procedure Laterality Date    ABLATION OF DYSRHYTHMIC FOCUS      CARDIAC CATHETERIZATION      COLONOSCOPY N/A 2020    COLONOSCOPY/ BMI 31 performed by Arya Vásquez MD at [address] ([place] [address])      2017 1 polyp    HEENT      tonsillectomy    UROLOGICAL SURGERY      vasectomy     Current Outpatient Medications   Medication Sig Dispense Refill    ascorbic acid (VITAMIN C) 500 MG tablet Take by mouth daily      fexofenadine (ALLEGRA) 180 MG tablet Take 180 mg by mouth daily      fluticasone (FLONASE) 50 MCG/ACT nasal spray 2 sprays by Nasal route daily      pravastatin (PRAVACHOL) 40 MG tablet Take 40 mg by mouth daily       No current facility-administered medications for this visit. Allergies   Allergen Reactions    Hydrocodone-Acetaminophen Nausea And Vomiting     Social History     Socioeconomic History    Marital status:      Spouse name: Not on file    Number of children: Not on file    Years of education: Not on file    Highest education level: Not on file   Occupational History    Not on file   Tobacco Use    Smoking status: Never    Smokeless tobacco: Never   Substance and Sexual Activity    Alcohol use: Yes     Alcohol/week: 1.7 standard drinks    Drug use: No    Sexual activity: Not on file   Other Topics Concern    Not on file   Social History Narrative    Not on file     Social Determinants of Health     Financial Resource Strain: Not on file   Food Insecurity: Not on file   Transportation Needs: Not on file   Physical Activity: Not on file   Stress: Not on file   Social Connections: Not on file   Intimate Partner Violence: Not on file   Housing Stability: Not on file     Family History   Problem Relation Age of Onset    Heart Disease Father        Review of Systems  Constitutional:   Negative for fever, chills, appetite change, malaise/fatigue, headaches and weight loss. Skin:  Negative for skin lesions, rash and itching. Eyes:  Negative for visual disturbance, eye pain and eye discharge. ENT:  Negative for difficulty articulating words, pain swallowing, high frequency hearing loss and dry mouth. Respiratory:  Negative for cough, blood in sputum, shortness of breath and wheezing. Cardiovascular:  Negative for chest pain, hypertension, irregular heartbeat, leg pain, leg swelling, regular rate and rhythm and varicose veins. GI:  Negative for nausea, vomiting, abdominal pain, blood in stool, constipation, diarrhea, indigestion and heartburn.   Genitourinary:  Negative for urinary burning, hematuria, flank pain, recurrent UTIs, history of urolithiasis, nocturia, slower stream, straining, urgency, leakage w/ urge, frequent urination, incomplete emptying, erectile dysfunction, testicular pain, sexually transmitted disease, discharge and urethral stricture. Musculoskeletal:  Negative for back pain, bone pain, arthralgias, tenderness, muscle weakness and neck pain. Neurological:  Negative for dizziness, focal weakness, numbness, seizures and tremors. Psychological:  Negative for depression and psychiatric problem. Endocrine:  Negative for cold intolerance, thirst, excessive urination, fatigue and heat intolerance. Hem/Lymphatic:  Negative for easy bleeding, easy bruising and frequent infections. Urinalysis  UA - Dipstick  @LASTPROCAMB([id number])@    UA - Micro  WBC - 0  RBC - 0  Bacteria - 0  Epith - 0    There were no vitals taken for this visit. GENERAL: No acute distress, Awake, Alert, Oriented X 3, Gait normal  CARDIAC: regular rate and rhythm  CHEST AND LUNG: Easy work of breathing, clear to auscultation bilaterally, no cyanosis  ABDOMEN: soft, non tender, non-distended, positive bowel sounds, no organomegaly, no palpable masses, no guarding, no rebound tenderness  ROBERTO CARLOS: 40 gram, symmetric, smooth without nodules. SKIN: No rash, no erythema, no lacerations or abrasions, no ecchymosis  NEUROLOGIC: cranial nerves 2-12 grossly intact       Assessment and Plan    ICD-10-CM    1. Elevated prostate specific antigen (PSA)  R97.20 AMB POC URINALYSIS DIP STICK AUTO W/O MICRO     PSA, Diagnostic        Elevated PSA:     PSA stable at 5.1. Will continue with annual PSA screening. Follow up 1 year with PSA prior    I have spent 20 minutes today reviewing previous notes, test results and face to face with the patient as well as documenting. Lance Bailey M.D.     [facility]  [address], [address] [facility]  Phone: [phone]  Fax: [phone]

## 2022-12-12 ENCOUNTER — OFFICE VISIT (OUTPATIENT)
Dept: UROLOGY | Age: 72
End: 2022-12-12
Payer: MEDICARE

## 2022-12-12 DIAGNOSIS — R97.20 ELEVATED PROSTATE SPECIFIC ANTIGEN (PSA): Primary | ICD-10-CM

## 2022-12-12 LAB
BILIRUBIN, URINE, POC: NEGATIVE
BLOOD URINE, POC: NORMAL
GLUCOSE URINE, POC: NEGATIVE
KETONES, URINE, POC: NEGATIVE
LEUKOCYTE ESTERASE, URINE, POC: NEGATIVE
NITRITE, URINE, POC: NEGATIVE
PH, URINE, POC: 6.5 (ref 4.6–8)
PROTEIN,URINE, POC: NEGATIVE
SPECIFIC GRAVITY, URINE, POC: 1.02 (ref 1–1.03)
URINALYSIS CLARITY, POC: NORMAL
URINALYSIS COLOR, POC: NORMAL
UROBILINOGEN, POC: NORMAL

## 2022-12-12 PROCEDURE — 99213 OFFICE O/P EST LOW 20 MIN: CPT | Performed by: UROLOGY

## 2022-12-12 PROCEDURE — G8417 CALC BMI ABV UP PARAM F/U: HCPCS | Performed by: UROLOGY

## 2022-12-12 PROCEDURE — 3017F COLORECTAL CA SCREEN DOC REV: CPT | Performed by: UROLOGY

## 2022-12-12 PROCEDURE — G8427 DOCREV CUR MEDS BY ELIG CLIN: HCPCS | Performed by: UROLOGY

## 2022-12-12 PROCEDURE — 1036F TOBACCO NON-USER: CPT | Performed by: UROLOGY

## 2022-12-12 PROCEDURE — G8484 FLU IMMUNIZE NO ADMIN: HCPCS | Performed by: UROLOGY

## 2022-12-12 PROCEDURE — 81003 URINALYSIS AUTO W/O SCOPE: CPT | Performed by: UROLOGY

## 2022-12-12 PROCEDURE — 1123F ACP DISCUSS/DSCN MKR DOCD: CPT | Performed by: UROLOGY

## 2023-04-20 ENCOUNTER — NURSE ONLY (OUTPATIENT)
Dept: UROLOGY | Age: 73
End: 2023-04-20

## 2023-04-20 DIAGNOSIS — R97.20 ELEVATED PROSTATE SPECIFIC ANTIGEN (PSA): ICD-10-CM

## 2023-04-20 LAB — PSA SERPL-MCNC: 5.9 NG/ML

## 2023-04-27 ENCOUNTER — OFFICE VISIT (OUTPATIENT)
Dept: UROLOGY | Age: 73
End: 2023-04-27
Payer: MEDICARE

## 2023-04-27 DIAGNOSIS — R97.20 ELEVATED PROSTATE SPECIFIC ANTIGEN (PSA): Primary | ICD-10-CM

## 2023-04-27 PROCEDURE — 1036F TOBACCO NON-USER: CPT | Performed by: UROLOGY

## 2023-04-27 PROCEDURE — 3017F COLORECTAL CA SCREEN DOC REV: CPT | Performed by: UROLOGY

## 2023-04-27 PROCEDURE — G8417 CALC BMI ABV UP PARAM F/U: HCPCS | Performed by: UROLOGY

## 2023-04-27 PROCEDURE — 99213 OFFICE O/P EST LOW 20 MIN: CPT | Performed by: UROLOGY

## 2023-04-27 PROCEDURE — G8427 DOCREV CUR MEDS BY ELIG CLIN: HCPCS | Performed by: UROLOGY

## 2023-04-27 PROCEDURE — 1123F ACP DISCUSS/DSCN MKR DOCD: CPT | Performed by: UROLOGY

## 2023-04-27 ASSESSMENT — ENCOUNTER SYMPTOMS: BACK PAIN: 0

## 2023-04-27 NOTE — PROGRESS NOTES
[facility]  [address] [place] [address]    [address], [address] [facility]  [phone]          Mora Neal  : 1950    Chief Complaint   Patient presents with    Follow-up          HPI     Mora Neal is a 75 y.o. male with a PMH of elevated PSA s/p negative biopsy 2018 who has been lost to follow up since that time. Referred 10/2021 for elevated PSA has being followed since that time. No FH of prostate cancer. Nocturia 1-2 times per night, but no hematuria, urgency, frequency, retention, UTIs or other urinary symptoms today. Not on any medications for prostate. Most recent PSA 5.9 (2022) consistent with baseline.        PSA Trend:    Lab Results   Component Value Date    PSA 5.9 (H) 2023    PSA 6.0 (H) 2022    PSA 5.1 (H) 2019     Past Medical History:   Diagnosis Date    Arrhythmia     Paroxysmal at fibrillation,at fib w/ RVR    Atrial fibrillation and flutter ([facility] [address] 2015    states had cardiac ablation    Bursitis of shoulder 2014    Dyslipidemia 2013    Family history of ischemic heart disease 2012    Hx of echocardiogram 2015    LVEF 60-65%    Hypercholesterolemia     managed with medication 2019    Hyperlipidemia 2013    Hypertension     19  States not taking medication    Ill-defined condition     HLD    Personal history of colonic polyps 2013    Unstable angina ([facility] [address] 2012     Past Surgical History:   Procedure Laterality Date    ABLATION OF DYSRHYTHMIC FOCUS      CARDIAC CATHETERIZATION      COLONOSCOPY N/A 2020    COLONOSCOPY/ BMI 31 performed by Darrell Norris MD at [address] ([place] [address])      2017 1 polyp    HEENT      tonsillectomy    UROLOGICAL SURGERY      vasectomy     Current Outpatient Medications   Medication Sig Dispense Refill    ascorbic acid (VITAMIN C) 500 MG tablet Take by mouth daily      fexofenadine (ALLEGRA) 180 MG tablet Take 1 tablet by mouth

## 2023-05-10 ENCOUNTER — APPOINTMENT (RX ONLY)
Dept: URBAN - METROPOLITAN AREA CLINIC 24 | Facility: CLINIC | Age: 73
Setting detail: DERMATOLOGY
End: 2023-05-10

## 2023-05-10 DIAGNOSIS — D18.0 HEMANGIOMA: ICD-10-CM

## 2023-05-10 DIAGNOSIS — L82.1 OTHER SEBORRHEIC KERATOSIS: ICD-10-CM

## 2023-05-10 DIAGNOSIS — Z85.828 PERSONAL HISTORY OF OTHER MALIGNANT NEOPLASM OF SKIN: ICD-10-CM

## 2023-05-10 DIAGNOSIS — Z87.2 PERSONAL HISTORY OF DISEASES OF THE SKIN AND SUBCUTANEOUS TISSUE: ICD-10-CM

## 2023-05-10 DIAGNOSIS — L57.8 OTHER SKIN CHANGES DUE TO CHRONIC EXPOSURE TO NONIONIZING RADIATION: ICD-10-CM

## 2023-05-10 DIAGNOSIS — L81.4 OTHER MELANIN HYPERPIGMENTATION: ICD-10-CM

## 2023-05-10 DIAGNOSIS — L40.0 PSORIASIS VULGARIS: ICD-10-CM

## 2023-05-10 DIAGNOSIS — D22 MELANOCYTIC NEVI: ICD-10-CM

## 2023-05-10 PROBLEM — D18.01 HEMANGIOMA OF SKIN AND SUBCUTANEOUS TISSUE: Status: ACTIVE | Noted: 2023-05-10

## 2023-05-10 PROBLEM — D22.5 MELANOCYTIC NEVI OF TRUNK: Status: ACTIVE | Noted: 2023-05-10

## 2023-05-10 PROCEDURE — ? INTRALESIONAL KENALOG

## 2023-05-10 PROCEDURE — 99214 OFFICE O/P EST MOD 30 MIN: CPT | Mod: 25

## 2023-05-10 PROCEDURE — 11900 INJECT SKIN LESIONS </W 7: CPT

## 2023-05-10 PROCEDURE — ? COUNSELING

## 2023-05-10 PROCEDURE — ? SEPARATE AND IDENTIFIABLE DOCUMENTATION

## 2023-05-10 PROCEDURE — ? PRESCRIPTION MEDICATION MANAGEMENT

## 2023-05-10 ASSESSMENT — LOCATION DETAILED DESCRIPTION DERM
LOCATION DETAILED: LEFT INFERIOR POSTERIOR NECK
LOCATION DETAILED: LEFT INFERIOR MEDIAL LOWER BACK
LOCATION DETAILED: LEFT LATERAL ABDOMEN
LOCATION DETAILED: RIGHT LATERAL SUPERIOR CHEST
LOCATION DETAILED: RIGHT CLAVICULAR NECK
LOCATION DETAILED: LEFT SUPERIOR LATERAL LOWER BACK
LOCATION DETAILED: RIGHT MEDIAL SUPERIOR CHEST
LOCATION DETAILED: RIGHT ANTERIOR DISTAL THIGH
LOCATION DETAILED: SUPERIOR LUMBAR SPINE
LOCATION DETAILED: RIGHT MEDIAL TRAPEZIAL NECK
LOCATION DETAILED: EPIGASTRIC SKIN
LOCATION DETAILED: RIGHT POSTERIOR SHOULDER
LOCATION DETAILED: LEFT SUPERIOR LATERAL UPPER BACK
LOCATION DETAILED: RIGHT SUPERIOR LATERAL UPPER BACK
LOCATION DETAILED: RIGHT LATERAL UPPER BACK
LOCATION DETAILED: LEFT POSTERIOR SHOULDER
LOCATION DETAILED: LEFT MID-UPPER BACK

## 2023-05-10 ASSESSMENT — LOCATION ZONE DERM
LOCATION ZONE: ARM
LOCATION ZONE: TRUNK
LOCATION ZONE: NECK
LOCATION ZONE: LEG

## 2023-05-10 ASSESSMENT — LOCATION SIMPLE DESCRIPTION DERM
LOCATION SIMPLE: LEFT LOWER BACK
LOCATION SIMPLE: RIGHT THIGH
LOCATION SIMPLE: LOWER BACK
LOCATION SIMPLE: RIGHT ANTERIOR NECK
LOCATION SIMPLE: RIGHT SHOULDER
LOCATION SIMPLE: RIGHT UPPER BACK
LOCATION SIMPLE: ABDOMEN
LOCATION SIMPLE: POSTERIOR NECK
LOCATION SIMPLE: CHEST
LOCATION SIMPLE: LEFT UPPER BACK
LOCATION SIMPLE: LEFT SHOULDER

## 2023-05-10 ASSESSMENT — BSA PSORIASIS: % BODY COVERED IN PSORIASIS: 3

## 2023-05-10 NOTE — PROCEDURE: INTRALESIONAL KENALOG
Detail Level: Detailed
Consent: The risks of atrophy were reviewed with the patient.
Include Z78.9 (Other Specified Conditions Influencing Health Status) As An Associated Diagnosis?: No
Validate Note Data When Using Inventory: Yes
How Many Mls Were Removed From The 40 Mg/Ml (1ml) Vial When Preparing The Injectable Solution?: 0
Lot # For Kenalog (Optional): [id number]
Ndc# For Kenalog Only: [id number]
Kenalog Preparation: Kenalog
Medical Necessity Clause: This procedure was medically necessary because the lesions that were treated were:
Concentration Of Kenalog Solution Injected (Mg/Ml): 2.5
Total Volume (Ccs): 1.0
Show Inventory Tab: Hide
Expiration Date For Kenalog (Optional): AUG 2024

## 2023-09-15 ENCOUNTER — APPOINTMENT (RX ONLY)
Dept: URBAN - METROPOLITAN AREA CLINIC 24 | Facility: CLINIC | Age: 73
Setting detail: DERMATOLOGY
End: 2023-09-15

## 2023-09-15 DIAGNOSIS — L57.0 ACTINIC KERATOSIS: ICD-10-CM

## 2023-09-15 DIAGNOSIS — L57.8 OTHER SKIN CHANGES DUE TO CHRONIC EXPOSURE TO NONIONIZING RADIATION: ICD-10-CM

## 2023-09-15 DIAGNOSIS — Z87.2 PERSONAL HISTORY OF DISEASES OF THE SKIN AND SUBCUTANEOUS TISSUE: ICD-10-CM

## 2023-09-15 DIAGNOSIS — Z85.828 PERSONAL HISTORY OF OTHER MALIGNANT NEOPLASM OF SKIN: ICD-10-CM

## 2023-09-15 DIAGNOSIS — L82.1 OTHER SEBORRHEIC KERATOSIS: ICD-10-CM

## 2023-09-15 DIAGNOSIS — L40.0 PSORIASIS VULGARIS: ICD-10-CM

## 2023-09-15 PROCEDURE — 17000 DESTRUCT PREMALG LESION: CPT

## 2023-09-15 PROCEDURE — ? COUNSELING

## 2023-09-15 PROCEDURE — ? LIQUID NITROGEN

## 2023-09-15 PROCEDURE — 99214 OFFICE O/P EST MOD 30 MIN: CPT | Mod: 25

## 2023-09-15 PROCEDURE — 17003 DESTRUCT PREMALG LES 2-14: CPT

## 2023-09-15 PROCEDURE — ? PRESCRIPTION MEDICATION MANAGEMENT

## 2023-09-15 ASSESSMENT — LOCATION SIMPLE DESCRIPTION DERM
LOCATION SIMPLE: CHEST
LOCATION SIMPLE: POSTERIOR NECK
LOCATION SIMPLE: RIGHT CHEEK
LOCATION SIMPLE: LEFT UPPER BACK
LOCATION SIMPLE: LEFT LOWER BACK
LOCATION SIMPLE: RIGHT SHOULDER

## 2023-09-15 ASSESSMENT — LOCATION DETAILED DESCRIPTION DERM
LOCATION DETAILED: LEFT INFERIOR UPPER BACK
LOCATION DETAILED: RIGHT POSTERIOR SHOULDER
LOCATION DETAILED: LEFT SUPERIOR LATERAL LOWER BACK
LOCATION DETAILED: RIGHT MEDIAL TRAPEZIAL NECK
LOCATION DETAILED: LEFT INFERIOR MEDIAL LOWER BACK
LOCATION DETAILED: RIGHT MEDIAL SUPERIOR CHEST
LOCATION DETAILED: RIGHT LATERAL MALAR CHEEK
LOCATION DETAILED: LEFT INFERIOR MEDIAL UPPER BACK
LOCATION DETAILED: LEFT SUPERIOR LATERAL UPPER BACK

## 2023-09-15 ASSESSMENT — LOCATION ZONE DERM
LOCATION ZONE: ARM
LOCATION ZONE: NECK
LOCATION ZONE: FACE
LOCATION ZONE: TRUNK

## 2023-09-15 ASSESSMENT — BSA PSORIASIS: % BODY COVERED IN PSORIASIS: 2

## 2023-09-15 ASSESSMENT — ITCH NUMERIC RATING SCALE: HOW SEVERE IS YOUR ITCHING?: 0

## 2023-09-15 NOTE — PROCEDURE: PRESCRIPTION MEDICATION MANAGEMENT
Render In Strict Bullet Format?: No
Plan: Patient is pleased with the stabs of his psoriasis.  Kenalog injections discussed as a future treatment option.
Detail Level: Zone
Continue Regimen: Betamethasone

## 2023-09-15 NOTE — PROCEDURE: LIQUID NITROGEN
Render Note In Bullet Format When Appropriate: No
Show Aperture Variable?: Yes
Consent: The patient's consent was obtained including but not limited to risks of crusting, scabbing, blistering, scarring, darker or lighter pigmentary change, recurrence, incomplete removal and infection.
Detail Level: Detailed
Number Of Freeze-Thaw Cycles: 1 freeze-thaw cycle
Post-Care Instructions: I reviewed with the patient in detail post-care instructions. Patient is to wear sunprotection, and avoid picking at any of the treated lesions. Pt may apply Vaseline to crusted or scabbing areas.
Duration Of Freeze Thaw-Cycle (Seconds): 4

## 2023-12-07 NOTE — PROGRESS NOTES
[facility]  [address]  [phone]    Tawny Bolaños  : 1950    \"One year f/u\"        HPI     Tawny Bolaños is a 75 y.o. male with a past medical history of elevated PSA s/p negative biopsy 2018 who presents for one year f/u. No FH of prostate cancer. Not on any medications for prostate. Originally referred in 10/2021 for elevated PSA and has been followed since that time. PSA has been stable. Most recent PSA was 5.9 on 2023 which has been stable. See trend below.      Lab Results   Component Value Date    PSA 5.9 (H) 2023    PSA 6.0 (H) 2022    PSA 5.1 (H) 2019       Past Medical History:   Diagnosis Date    Arrhythmia     Paroxysmal at fibrillation,at fib w/ RVR    Atrial fibrillation and flutter ([address]) 2015    states had cardiac ablation    Bursitis of shoulder 2014    Dyslipidemia 2013    Family history of ischemic heart disease 2012    Hx of echocardiogram 2015    LVEF 60-65%    Hypercholesterolemia     managed with medication 2019    Hyperlipidemia 2013    Hypertension     19  States not taking medication    Ill-defined condition     HLD    Personal history of colonic polyps 2013    Unstable angina ([address]) 2012     Past Surgical History:   Procedure Laterality Date    ABLATION OF DYSRHYTHMIC FOCUS      CARDIAC CATHETERIZATION      COLONOSCOPY N/A 2020    COLONOSCOPY/ BMI 31 performed by Lisbeth Aguilar MD at [facility]       1 polyp    HEENT      tonsillectomy    UROLOGICAL SURGERY      vasectomy     Current Outpatient Medications   Medication Sig Dispense Refill    ascorbic acid (VITAMIN C) 500 MG tablet Take by mouth daily      fexofenadine (ALLEGRA) 180 MG tablet Take 1 tablet by mouth daily      fluticasone (FLONASE) 50 MCG/ACT nasal spray 2 sprays by Nasal route daily      pravastatin (PRAVACHOL) 40 MG

## 2023-12-13 ENCOUNTER — OFFICE VISIT (OUTPATIENT)
Dept: UROLOGY | Age: 73
End: 2023-12-13
Payer: MEDICARE

## 2023-12-13 DIAGNOSIS — R97.20 ELEVATED PROSTATE SPECIFIC ANTIGEN (PSA): ICD-10-CM

## 2023-12-13 DIAGNOSIS — R97.20 ELEVATED PROSTATE SPECIFIC ANTIGEN (PSA): Primary | ICD-10-CM

## 2023-12-13 LAB
BILIRUBIN, URINE, POC: NEGATIVE
BLOOD URINE, POC: NORMAL
GLUCOSE URINE, POC: NEGATIVE
KETONES, URINE, POC: NEGATIVE
LEUKOCYTE ESTERASE, URINE, POC: NEGATIVE
NITRITE, URINE, POC: NEGATIVE
PH, URINE, POC: 7 (ref 4.6–8)
PROTEIN,URINE, POC: NEGATIVE
PSA SERPL-MCNC: 6.4 NG/ML
SPECIFIC GRAVITY, URINE, POC: 1.01 (ref 1–1.03)
URINALYSIS CLARITY, POC: NORMAL
URINALYSIS COLOR, POC: NORMAL
UROBILINOGEN, POC: NORMAL

## 2023-12-13 PROCEDURE — 99213 OFFICE O/P EST LOW 20 MIN: CPT | Performed by: UROLOGY

## 2023-12-13 PROCEDURE — G8484 FLU IMMUNIZE NO ADMIN: HCPCS | Performed by: UROLOGY

## 2023-12-13 PROCEDURE — G8421 BMI NOT CALCULATED: HCPCS | Performed by: UROLOGY

## 2023-12-13 PROCEDURE — 1123F ACP DISCUSS/DSCN MKR DOCD: CPT | Performed by: UROLOGY

## 2023-12-13 PROCEDURE — 1036F TOBACCO NON-USER: CPT | Performed by: UROLOGY

## 2023-12-13 PROCEDURE — 3017F COLORECTAL CA SCREEN DOC REV: CPT | Performed by: UROLOGY

## 2023-12-13 PROCEDURE — 81003 URINALYSIS AUTO W/O SCOPE: CPT | Performed by: UROLOGY

## 2023-12-13 PROCEDURE — G8427 DOCREV CUR MEDS BY ELIG CLIN: HCPCS | Performed by: UROLOGY

## 2023-12-13 ASSESSMENT — ENCOUNTER SYMPTOMS
NAUSEA: 0
EYE DISCHARGE: 0
SHORTNESS OF BREATH: 0
HEARTBURN: 0
COUGH: 0
EYE PAIN: 0
INDIGESTION: 0
DIARRHEA: 0
BACK PAIN: 0
CONSTIPATION: 0
WHEEZING: 0
ABDOMINAL PAIN: 0
SKIN LESIONS: 0
VOMITING: 0
BLOOD IN STOOL: 0

## 2023-12-14 NOTE — RESULT ENCOUNTER NOTE
Santhosh Redfern, your PSA is stable at 6.4.  This is consistent with your prior values.  Please keep your follow up with me as scheduled.     Best Regards,  Dr. Beth

## 2024-04-05 ENCOUNTER — APPOINTMENT (RX ONLY)
Dept: URBAN - METROPOLITAN AREA CLINIC 24 | Facility: CLINIC | Age: 74
Setting detail: DERMATOLOGY
End: 2024-04-05

## 2024-04-05 DIAGNOSIS — L40.0 PSORIASIS VULGARIS: ICD-10-CM

## 2024-04-05 PROCEDURE — ? PRESCRIPTION MEDICATION MANAGEMENT

## 2024-04-05 PROCEDURE — ? COUNSELING

## 2024-04-05 PROCEDURE — 99214 OFFICE O/P EST MOD 30 MIN: CPT

## 2024-04-05 PROCEDURE — ? PRESCRIPTION

## 2024-04-05 RX ORDER — BETAMETHASONE DIPROPIONATE 0.5 MG/G
OINTMENT TOPICAL
Qty: 45 | Refills: 3 | Status: ERX

## 2024-04-05 ASSESSMENT — BSA PSORIASIS: % BODY COVERED IN PSORIASIS: 2

## 2024-04-05 ASSESSMENT — LOCATION DETAILED DESCRIPTION DERM
LOCATION DETAILED: LEFT INFERIOR MEDIAL LOWER BACK
LOCATION DETAILED: LEFT SUPERIOR LATERAL LOWER BACK

## 2024-04-05 ASSESSMENT — LOCATION SIMPLE DESCRIPTION DERM: LOCATION SIMPLE: LEFT LOWER BACK

## 2024-04-05 ASSESSMENT — LOCATION ZONE DERM: LOCATION ZONE: TRUNK

## 2024-04-05 ASSESSMENT — ITCH NUMERIC RATING SCALE: HOW SEVERE IS YOUR ITCHING?: 2

## 2024-04-05 NOTE — PROCEDURE: MIPS QUALITY
Quality 130: Documentation Of Current Medications In The Medical Record: Current Medications Documented
Detail Level: Detailed
Quality 47: Advance Care Plan: Advance care planning not documented, reason not otherwise specified.
Quality 431: Preventive Care And Screening: Unhealthy Alcohol Use - Screening: Patient not identified as an unhealthy alcohol user when screened for unhealthy alcohol use using a systematic screening method
Quality 226: Preventive Care And Screening: Tobacco Use: Screening And Cessation Intervention: Patient screened for tobacco use and is an ex/non-smoker

## 2024-04-29 ENCOUNTER — OFFICE VISIT (OUTPATIENT)
Dept: UROLOGY | Age: 74
End: 2024-04-29
Payer: MEDICARE

## 2024-04-29 ENCOUNTER — TELEPHONE (OUTPATIENT)
Dept: UROLOGY | Age: 74
End: 2024-04-29

## 2024-04-29 DIAGNOSIS — R97.20 ELEVATED PROSTATE SPECIFIC ANTIGEN (PSA): Primary | ICD-10-CM

## 2024-04-29 LAB
BILIRUBIN, URINE, POC: NEGATIVE
BLOOD URINE, POC: NEGATIVE
GLUCOSE URINE, POC: NEGATIVE
KETONES, URINE, POC: NEGATIVE
LEUKOCYTE ESTERASE, URINE, POC: NEGATIVE
NITRITE, URINE, POC: NEGATIVE
PH, URINE, POC: 7 (ref 4.6–8)
PROTEIN,URINE, POC: NEGATIVE
SPECIFIC GRAVITY, URINE, POC: 1.01 (ref 1–1.03)
URINALYSIS CLARITY, POC: NORMAL
URINALYSIS COLOR, POC: NORMAL
UROBILINOGEN, POC: NORMAL

## 2024-04-29 PROCEDURE — 81003 URINALYSIS AUTO W/O SCOPE: CPT | Performed by: UROLOGY

## 2024-04-29 PROCEDURE — 1036F TOBACCO NON-USER: CPT | Performed by: UROLOGY

## 2024-04-29 PROCEDURE — 3017F COLORECTAL CA SCREEN DOC REV: CPT | Performed by: UROLOGY

## 2024-04-29 PROCEDURE — 1123F ACP DISCUSS/DSCN MKR DOCD: CPT | Performed by: UROLOGY

## 2024-04-29 PROCEDURE — G8421 BMI NOT CALCULATED: HCPCS | Performed by: UROLOGY

## 2024-04-29 PROCEDURE — 99213 OFFICE O/P EST LOW 20 MIN: CPT | Performed by: UROLOGY

## 2024-04-29 PROCEDURE — G8427 DOCREV CUR MEDS BY ELIG CLIN: HCPCS | Performed by: UROLOGY

## 2024-04-29 ASSESSMENT — ENCOUNTER SYMPTOMS
EYE DISCHARGE: 0
VOMITING: 0
WHEEZING: 0
BACK PAIN: 0
ABDOMINAL PAIN: 0
EYE PAIN: 0
INDIGESTION: 0
HEARTBURN: 0
SKIN LESIONS: 0
DIARRHEA: 0
BLOOD IN STOOL: 0
COUGH: 0
CONSTIPATION: 0
NAUSEA: 0
SHORTNESS OF BREATH: 0

## 2024-05-13 ENCOUNTER — APPOINTMENT (RX ONLY)
Dept: URBAN - METROPOLITAN AREA CLINIC 24 | Facility: CLINIC | Age: 74
Setting detail: DERMATOLOGY
End: 2024-05-13

## 2024-05-13 DIAGNOSIS — Z85.828 PERSONAL HISTORY OF OTHER MALIGNANT NEOPLASM OF SKIN: ICD-10-CM

## 2024-05-13 DIAGNOSIS — L57.8 OTHER SKIN CHANGES DUE TO CHRONIC EXPOSURE TO NONIONIZING RADIATION: ICD-10-CM

## 2024-05-13 DIAGNOSIS — L40.0 PSORIASIS VULGARIS: ICD-10-CM

## 2024-05-13 DIAGNOSIS — Z87.2 PERSONAL HISTORY OF DISEASES OF THE SKIN AND SUBCUTANEOUS TISSUE: ICD-10-CM

## 2024-05-13 DIAGNOSIS — L57.0 ACTINIC KERATOSIS: ICD-10-CM

## 2024-05-13 PROCEDURE — ? INTRALESIONAL KENALOG

## 2024-05-13 PROCEDURE — 17000 DESTRUCT PREMALG LESION: CPT

## 2024-05-13 PROCEDURE — 11900 INJECT SKIN LESIONS </W 7: CPT | Mod: 59

## 2024-05-13 PROCEDURE — ? LIQUID NITROGEN

## 2024-05-13 PROCEDURE — 99214 OFFICE O/P EST MOD 30 MIN: CPT | Mod: 25

## 2024-05-13 PROCEDURE — ? SEPARATE AND IDENTIFIABLE DOCUMENTATION

## 2024-05-13 PROCEDURE — ? PRESCRIPTION MEDICATION MANAGEMENT

## 2024-05-13 PROCEDURE — ? PRESCRIPTION

## 2024-05-13 PROCEDURE — ? COUNSELING

## 2024-05-13 RX ORDER — CALCIPOTRIENE 50 UG/G
CREAM TOPICAL
Qty: 60 | Refills: 3 | Status: ERX | COMMUNITY
Start: 2024-05-13

## 2024-05-13 RX ADMIN — CALCIPOTRIENE: 50 CREAM TOPICAL at 00:00

## 2024-05-13 ASSESSMENT — LOCATION DETAILED DESCRIPTION DERM
LOCATION DETAILED: LEFT SUPERIOR LATERAL LOWER BACK
LOCATION DETAILED: RIGHT MEDIAL SUPERIOR CHEST
LOCATION DETAILED: RIGHT MEDIAL TRAPEZIAL NECK
LOCATION DETAILED: RIGHT POSTERIOR SHOULDER
LOCATION DETAILED: LEFT SUPERIOR MEDIAL LOWER BACK
LOCATION DETAILED: RIGHT SUPERIOR MEDIAL LOWER BACK
LOCATION DETAILED: RIGHT CENTRAL ZYGOMA
LOCATION DETAILED: RIGHT INFERIOR LATERAL MIDBACK
LOCATION DETAILED: LEFT INFERIOR MEDIAL LOWER BACK

## 2024-05-13 ASSESSMENT — LOCATION SIMPLE DESCRIPTION DERM
LOCATION SIMPLE: RIGHT LOWER BACK
LOCATION SIMPLE: LEFT LOWER BACK
LOCATION SIMPLE: POSTERIOR NECK
LOCATION SIMPLE: RIGHT SHOULDER
LOCATION SIMPLE: RIGHT ZYGOMA
LOCATION SIMPLE: CHEST

## 2024-05-13 ASSESSMENT — LOCATION ZONE DERM
LOCATION ZONE: NECK
LOCATION ZONE: ARM
LOCATION ZONE: FACE
LOCATION ZONE: TRUNK

## 2024-05-13 NOTE — PROCEDURE: INTRALESIONAL KENALOG
Ndc# For Kenalog Only: [id number]
X Size Of Lesion In Cm (Optional): 0
Concentration Of Kenalog Solution Injected (Mg/Ml): 5.0
Bill For Wasted Drug (Kenalog)?: no
Kenalog Preparation: Kenalog
Total Volume (Ccs): 0.8
Medical Necessity Clause: This procedure was medically necessary because the lesions that were treated were:
Validate Note Data When Using Inventory: Yes
Detail Level: Detailed
Kenalog Type Of Vial: Multiple Dose
Consent: The risks of atrophy were reviewed with the patient.
Lot # For Kenalog (Optional): [id number]
Administered By (Optional): Farshad
Show Inventory Tab: Hide
Expiration Date For Kenalog (Optional): 12/2025

## 2024-05-13 NOTE — PROCEDURE: LIQUID NITROGEN
Render Post-Care Instructions In Note?: no
Show Applicator Variable?: Yes
Consent: The patient's consent was obtained including but not limited to risks of crusting, scabbing, blistering, scarring, darker or lighter pigmentary change, recurrence, incomplete removal and infection.
Number Of Freeze-Thaw Cycles: 1 freeze-thaw cycle
Detail Level: Detailed
Post-Care Instructions: I reviewed with the patient in detail post-care instructions. Patient is to wear sunprotection, and avoid picking at any of the treated lesions. Pt may apply Vaseline to crusted or scabbing areas.
Duration Of Freeze Thaw-Cycle (Seconds): 5

## 2024-05-13 NOTE — PROCEDURE: PRESCRIPTION MEDICATION MANAGEMENT
Render In Strict Bullet Format?: No
Plan: .
Detail Level: Zone
Continue Regimen: Betamethasone alternated with calcipotriene

## 2024-08-12 ENCOUNTER — APPOINTMENT (RX ONLY)
Dept: URBAN - METROPOLITAN AREA CLINIC 24 | Facility: CLINIC | Age: 74
Setting detail: DERMATOLOGY
End: 2024-08-12

## 2024-08-12 DIAGNOSIS — L40.0 PSORIASIS VULGARIS: ICD-10-CM | Status: WELL CONTROLLED

## 2024-08-12 PROCEDURE — ? PRESCRIPTION MEDICATION MANAGEMENT

## 2024-08-12 PROCEDURE — ? COUNSELING

## 2024-08-12 PROCEDURE — ? PRESCRIPTION

## 2024-08-12 PROCEDURE — 99213 OFFICE O/P EST LOW 20 MIN: CPT

## 2024-08-12 RX ORDER — KETOCONAZOLE 20 MG/ML
SHAMPOO, SUSPENSION TOPICAL
Qty: 120 | Refills: 11 | Status: ERX | COMMUNITY
Start: 2024-08-12

## 2024-08-12 RX ADMIN — KETOCONAZOLE: 20 SHAMPOO, SUSPENSION TOPICAL at 00:00

## 2024-08-12 ASSESSMENT — LOCATION DETAILED DESCRIPTION DERM: LOCATION DETAILED: LEFT SUPERIOR FOREHEAD

## 2024-08-12 ASSESSMENT — ITCH NUMERIC RATING SCALE: HOW SEVERE IS YOUR ITCHING?: 1

## 2024-08-12 ASSESSMENT — BSA PSORIASIS: % BODY COVERED IN PSORIASIS: 2

## 2024-08-12 ASSESSMENT — LOCATION SIMPLE DESCRIPTION DERM: LOCATION SIMPLE: LEFT FOREHEAD

## 2024-08-12 ASSESSMENT — LOCATION ZONE DERM: LOCATION ZONE: FACE

## 2024-08-12 ASSESSMENT — PGA PSORIASIS: PGA PSORIASIS 2020: ALMOST CLEAR

## 2024-12-06 ENCOUNTER — LAB (OUTPATIENT)
Dept: UROLOGY | Age: 74
End: 2024-12-06

## 2024-12-06 DIAGNOSIS — R97.20 ELEVATED PROSTATE SPECIFIC ANTIGEN (PSA): ICD-10-CM

## 2024-12-06 LAB — PSA SERPL-MCNC: 5.8 NG/ML (ref 0–4)

## 2024-12-13 ENCOUNTER — OFFICE VISIT (OUTPATIENT)
Dept: UROLOGY | Age: 74
End: 2024-12-13
Payer: MEDICARE

## 2024-12-13 DIAGNOSIS — R97.20 ELEVATED PROSTATE SPECIFIC ANTIGEN (PSA): Primary | ICD-10-CM

## 2024-12-13 LAB
BILIRUBIN, URINE, POC: NEGATIVE
BLOOD URINE, POC: NEGATIVE
GLUCOSE URINE, POC: NEGATIVE MG/DL
KETONES, URINE, POC: NEGATIVE MG/DL
LEUKOCYTE ESTERASE, URINE, POC: NEGATIVE
NITRITE, URINE, POC: NEGATIVE
PH, URINE, POC: 7 (ref 4.6–8)
PROTEIN,URINE, POC: NEGATIVE MG/DL
SPECIFIC GRAVITY, URINE, POC: 1.01 (ref 1–1.03)
URINALYSIS CLARITY, POC: NORMAL
URINALYSIS COLOR, POC: NORMAL
UROBILINOGEN, POC: NORMAL MG/DL

## 2024-12-13 PROCEDURE — 81003 URINALYSIS AUTO W/O SCOPE: CPT | Performed by: UROLOGY

## 2024-12-13 PROCEDURE — 3017F COLORECTAL CA SCREEN DOC REV: CPT | Performed by: UROLOGY

## 2024-12-13 PROCEDURE — 1159F MED LIST DOCD IN RCRD: CPT | Performed by: UROLOGY

## 2024-12-13 PROCEDURE — G8421 BMI NOT CALCULATED: HCPCS | Performed by: UROLOGY

## 2024-12-13 PROCEDURE — 1036F TOBACCO NON-USER: CPT | Performed by: UROLOGY

## 2024-12-13 PROCEDURE — G8427 DOCREV CUR MEDS BY ELIG CLIN: HCPCS | Performed by: UROLOGY

## 2024-12-13 PROCEDURE — G8484 FLU IMMUNIZE NO ADMIN: HCPCS | Performed by: UROLOGY

## 2024-12-13 PROCEDURE — 99213 OFFICE O/P EST LOW 20 MIN: CPT | Performed by: UROLOGY

## 2024-12-13 PROCEDURE — 1123F ACP DISCUSS/DSCN MKR DOCD: CPT | Performed by: UROLOGY

## 2024-12-14 ASSESSMENT — ENCOUNTER SYMPTOMS
BACK PAIN: 0
DIARRHEA: 0
WHEEZING: 0
EYE DISCHARGE: 0
SKIN LESIONS: 0
NAUSEA: 0
VOMITING: 0
EYE PAIN: 0
SHORTNESS OF BREATH: 0
CONSTIPATION: 0
COUGH: 0
HEARTBURN: 0
BLOOD IN STOOL: 0
INDIGESTION: 0
ABDOMINAL PAIN: 0

## 2025-04-22 ENCOUNTER — LAB (OUTPATIENT)
Dept: UROLOGY | Age: 75
End: 2025-04-22

## 2025-04-22 DIAGNOSIS — R97.20 ELEVATED PROSTATE SPECIFIC ANTIGEN (PSA): ICD-10-CM

## 2025-04-22 LAB — PSA SERPL-MCNC: 5.7 NG/ML (ref 0–4)

## 2025-04-30 ENCOUNTER — OFFICE VISIT (OUTPATIENT)
Dept: UROLOGY | Age: 75
End: 2025-04-30
Payer: MEDICARE

## 2025-04-30 DIAGNOSIS — G89.29 CHRONIC BILATERAL LOW BACK PAIN WITHOUT SCIATICA: ICD-10-CM

## 2025-04-30 DIAGNOSIS — R35.1 NOCTURIA: ICD-10-CM

## 2025-04-30 DIAGNOSIS — M54.50 CHRONIC BILATERAL LOW BACK PAIN WITHOUT SCIATICA: ICD-10-CM

## 2025-04-30 DIAGNOSIS — R97.20 ELEVATED PROSTATE SPECIFIC ANTIGEN (PSA): Primary | ICD-10-CM

## 2025-04-30 LAB
BILIRUBIN, URINE, POC: NEGATIVE
BLOOD URINE, POC: NEGATIVE
GLUCOSE URINE, POC: NEGATIVE MG/DL
KETONES, URINE, POC: NEGATIVE MG/DL
LEUKOCYTE ESTERASE, URINE, POC: NEGATIVE
NITRITE, URINE, POC: NEGATIVE
PH, URINE, POC: 7 (ref 4.6–8)
PROTEIN,URINE, POC: NEGATIVE MG/DL
SPECIFIC GRAVITY, URINE, POC: 1.01 (ref 1–1.03)
URINALYSIS CLARITY, POC: ABNORMAL
URINALYSIS COLOR, POC: ABNORMAL
UROBILINOGEN, POC: ABNORMAL MG/DL

## 2025-04-30 PROCEDURE — 1159F MED LIST DOCD IN RCRD: CPT | Performed by: UROLOGY

## 2025-04-30 PROCEDURE — 99214 OFFICE O/P EST MOD 30 MIN: CPT | Performed by: UROLOGY

## 2025-04-30 PROCEDURE — 1036F TOBACCO NON-USER: CPT | Performed by: UROLOGY

## 2025-04-30 PROCEDURE — G8427 DOCREV CUR MEDS BY ELIG CLIN: HCPCS | Performed by: UROLOGY

## 2025-04-30 PROCEDURE — G8421 BMI NOT CALCULATED: HCPCS | Performed by: UROLOGY

## 2025-04-30 PROCEDURE — 1123F ACP DISCUSS/DSCN MKR DOCD: CPT | Performed by: UROLOGY

## 2025-04-30 PROCEDURE — 81003 URINALYSIS AUTO W/O SCOPE: CPT | Performed by: UROLOGY

## 2025-04-30 ASSESSMENT — ENCOUNTER SYMPTOMS
EYE PAIN: 0
VOMITING: 0
WHEEZING: 0
NAUSEA: 0
COUGH: 0
ABDOMINAL PAIN: 0
SKIN LESIONS: 0
EYE DISCHARGE: 0
SHORTNESS OF BREATH: 0
BACK PAIN: 0
DIARRHEA: 0
BLOOD IN STOOL: 0
INDIGESTION: 0
HEARTBURN: 0
CONSTIPATION: 0

## 2025-04-30 NOTE — PROGRESS NOTES
swelling, regular rate and rhythm and varicose veins.  GI:  Negative for nausea, vomiting, abdominal pain, blood in stool, constipation, diarrhea, indigestion and heartburn.  Genitourinary:  Negative for urinary burning, hematuria, flank pain, recurrent UTIs, history of urolithiasis, nocturia, slower stream, straining, urgency, leakage w/ urge, frequent urination, incomplete emptying, erectile dysfunction, testicular pain, sexually transmitted disease, discharge and urethral stricture.  Musculoskeletal:  Negative for back pain, bone pain, arthralgias, tenderness, muscle weakness and neck pain.  Neurological:  Negative for dizziness, focal weakness, numbness, seizures and tremors.  Psychological:  Negative for depression and psychiatric problem.  Endocrine:  Negative for cold intolerance, thirst, excessive urination, fatigue and heat intolerance.  Hem/Lymphatic:  Negative for easy bleeding, easy bruising and frequent infections.      Urinalysis  UA - Dipstick  @LASTPROCAMB([id number])@    UA - Micro  WBC - 0  RBC - 0  Bacteria - 0  Epith - 0    Physical Exam    There were no vitals taken for this visit.     GENERAL: No acute distress, Awake, Alert, Oriented X 3, Gait normal  CARDIAC: regular rate and rhythm  CHEST AND LUNG: Easy work of breathing, clear to auscultation bilaterally, no cyanosis  ABDOMEN: soft, non tender, non-distended, positive bowel sounds, no organomegaly, no palpable masses, no guarding, no rebound tenderness  SKIN: No rash, no erythema, no lacerations or abrasions, no ecchymosis  NEUROLOGIC: cranial nerves 2-12 grossly intact     The prostate is smooth, symmetric, with no nodules, weighing 40 g.    Results  Laboratory Studies  PSA level is stable at 5.7. Urine test is negative for blood or infection.    Assessment and Plan    ICD-10-CM    1. Elevated prostate specific antigen (PSA)  R97.20 AMB POC URINALYSIS DIP STICK AUTO W/O MICRO     PSA, Diagnostic      2. Nocturia  R35.1       3.

## 2025-05-14 ENCOUNTER — APPOINTMENT (OUTPATIENT)
Dept: URBAN - METROPOLITAN AREA CLINIC 24 | Facility: CLINIC | Age: 75
Setting detail: DERMATOLOGY
End: 2025-05-14

## 2025-05-14 DIAGNOSIS — L40.0 PSORIASIS VULGARIS: ICD-10-CM

## 2025-05-14 DIAGNOSIS — L57.8 OTHER SKIN CHANGES DUE TO CHRONIC EXPOSURE TO NONIONIZING RADIATION: ICD-10-CM

## 2025-05-14 DIAGNOSIS — L57.0 ACTINIC KERATOSIS: ICD-10-CM

## 2025-05-14 PROCEDURE — 17000 DESTRUCT PREMALG LESION: CPT

## 2025-05-14 PROCEDURE — ? COUNSELING

## 2025-05-14 PROCEDURE — 99214 OFFICE O/P EST MOD 30 MIN: CPT | Mod: 25

## 2025-05-14 PROCEDURE — ? INTRALESIONAL KENALOG

## 2025-05-14 PROCEDURE — ? SEPARATE AND IDENTIFIABLE DOCUMENTATION

## 2025-05-14 PROCEDURE — 11900 INJECT SKIN LESIONS </W 7: CPT | Mod: 59

## 2025-05-14 PROCEDURE — ? PRESCRIPTION MEDICATION MANAGEMENT

## 2025-05-14 PROCEDURE — 17003 DESTRUCT PREMALG LES 2-14: CPT

## 2025-05-14 PROCEDURE — ? LIQUID NITROGEN

## 2025-05-14 ASSESSMENT — LOCATION DETAILED DESCRIPTION DERM
LOCATION DETAILED: LEFT INFERIOR MEDIAL LOWER BACK
LOCATION DETAILED: RIGHT INFERIOR LATERAL MIDBACK
LOCATION DETAILED: LEFT SUPERIOR LATERAL LOWER BACK
LOCATION DETAILED: RIGHT MEDIAL ZYGOMA
LOCATION DETAILED: LEFT CLAVICULAR SKIN
LOCATION DETAILED: LEFT SUPERIOR MEDIAL LOWER BACK
LOCATION DETAILED: LEFT CENTRAL MALAR CHEEK
LOCATION DETAILED: RIGHT SUPERIOR MEDIAL LOWER BACK
LOCATION DETAILED: LEFT SUPERIOR LATERAL MALAR CHEEK
LOCATION DETAILED: RIGHT POSTERIOR SHOULDER

## 2025-05-14 ASSESSMENT — LOCATION SIMPLE DESCRIPTION DERM
LOCATION SIMPLE: RIGHT ZYGOMA
LOCATION SIMPLE: RIGHT SHOULDER
LOCATION SIMPLE: LEFT CHEEK
LOCATION SIMPLE: RIGHT LOWER BACK
LOCATION SIMPLE: LEFT LOWER BACK
LOCATION SIMPLE: LEFT CLAVICULAR SKIN

## 2025-05-14 ASSESSMENT — LOCATION ZONE DERM
LOCATION ZONE: ARM
LOCATION ZONE: TRUNK
LOCATION ZONE: FACE

## 2025-05-14 NOTE — PROCEDURE: LIQUID NITROGEN
Show Applicator Variable?: Yes
Consent: The patient's consent was obtained including but not limited to risks of crusting, scabbing, blistering, scarring, darker or lighter pigmentary change, recurrence, incomplete removal and infection.
Render Post-Care Instructions In Note?: no
Duration Of Freeze Thaw-Cycle (Seconds): 4
Detail Level: Detailed
Number Of Freeze-Thaw Cycles: 1 freeze-thaw cycle
Post-Care Instructions: I reviewed with the patient in detail post-care instructions. Patient is to wear sunprotection, and avoid picking at any of the treated lesions. Pt may apply Vaseline to crusted or scabbing areas.

## 2025-05-14 NOTE — PROCEDURE: INTRALESIONAL KENALOG
Ndc# For Kenalog Only: [id number]
X Size Of Lesion In Cm (Optional): 0
Concentration Of Kenalog Solution Injected (Mg/Ml): 10.0
Bill For Wasted Drug (Kenalog)?: no
Kenalog Preparation: Kenalog
Total Volume (Ccs): 0.6
Medical Necessity Clause: This procedure was medically necessary because the lesions that were treated were:
Validate Note Data When Using Inventory: Yes
Detail Level: Detailed
Kenalog Type Of Vial: Multiple Dose
Consent: The risks of atrophy were reviewed with the patient.
Lot # For Kenalog (Optional): [id number]
Administered By (Optional): Farshad
Show Inventory Tab: Hide
Expiration Date For Kenalog (Optional): 8/2027

## 2025-07-30 ENCOUNTER — RX ONLY (RX ONLY)
Age: 75
End: 2025-07-30

## 2025-07-30 RX ORDER — BETAMETHASONE DIPROPIONATE 0.5 MG/G
OINTMENT TOPICAL
Qty: 45 | Refills: 3 | Status: ERX

## (undated) DEVICE — SYR 5ML 1/5 GRAD LL NSAF LF --

## (undated) DEVICE — CANNULA NSL ORAL AD FOR CAPNOFLEX CO2 O2 AIRLFE

## (undated) DEVICE — NDL PRT INJ NSAF BLNT 18GX1.5 --

## (undated) DEVICE — KENDALL RADIOLUCENT FOAM MONITORING ELECTRODE RECTANGULAR SHAPE: Brand: KENDALL

## (undated) DEVICE — CONNECTOR TBNG OD5-7MM O2 END DISP

## (undated) DEVICE — SYR 3ML LL TIP 1/10ML GRAD --